# Patient Record
(demographics unavailable — no encounter records)

---

## 2024-10-18 NOTE — REVIEW OF SYSTEMS
[SOB on Exertion] : shortness of breath during exertion [Diarrhea: Grade 0] : Diarrhea: Grade 0 [Negative] : Heme/Lymph

## 2024-10-29 NOTE — ASSESSMENT
[Reviewed updated] : Reviewed updated [Designated Health Care Proxy] : Designated Health Care Proxy [Name: ___] : Name: [unfilled] [Relationship: ___] : Relationship: [unfilled] [Full Code] : full code [FreeTextEntry1] : #Elevated ferritin and iron saturation Discussed etiologies to include increase intake, increased absorption due to primary hematological disorders ie. hemochromatosis/thalassemia, or chronic liver disease especially alcohol- associated fatty liver disease.  No hx of transfusions  MRI liver- mild to moderate iron deposition Negative hemochromatosis Iron sat 60%, Ferritin 779 Hemochromatosis extended panel- invitae negative Continue to monitor   #Anemia- Macrocytic TSH normal B12, MMA, and folate wnl Elevated ESR SPEP shows paraproteins and elevated kappa FLC Immunofixation: One IgM Kappa Band, Two Weak IgM Lambda Bands, and Two Weak IgA Kappa Bands Likely due to chronic ETOH use however given would confirm with BM bx   #IgM MGUS plasma cell on bm Bx 2-3% discussed significance with patient.  1% yearly risk for progression to Waldenstrom/MM/LGL lymphoma No indication for treatment at this time.  >CBC, CMP, iron studies, MM panel >no indication for any intervention at this time. >Continue to monitor >discussed ETOH cessation  Follow up in 3 months with repeat labs      [AdvancecareDate] : 08/23/24

## 2024-10-29 NOTE — HISTORY OF PRESENT ILLNESS
[ECOG Performance Status: 0 - Fully active, able to carry on all pre-disease performance without restriction] : Performance Status: 0 - Fully active, able to carry on all pre-disease performance without restriction [de-identified] : Mr. Jose David Crawford is a 68 year old male referred to office by GI for initial consultation for iron overload.  Referred to GI by PCP for anemia. Seen by Cally Dial on 24 and had the following labs: ferritin: 833, iron: 206, unsaturated binding capacity: 25, %sat: 89. US performed on 6/10/24  FINDINGS:  Liver: Hepatic steatosis.  Bile ducts: Normal caliber. Common bile duct measures 5 mm in caliber which is within normal limits.  Gallbladder: Within normal limits.  Pancreas: Visualized portions are within normal limits.  Right kidney: 11.5 cm. No hydronephrosis. Within normal limits.  Ascites: None.  IVC: Visualized portions are within normal limits.  IMPRESSION:  Hepatic steatosis.  CT scan performed on 14 IMPRESSION:  Few nonspecific sub-4 mm pulmonary parenchymal nodules and subcentimeter juxtapleural nodules. Consider follow-up chest CT in one year unless outside imaging is available for comparison.  Hepatic steatosis.  Patient endorses heavy alcohol use on a daily basis. He is in contact with the [place] substance abuse treatment program but is not ready to completely abstain from ETOH. Denies family hx of iron overload Hemochromatosis gene panel negative  Past med hx: HTN, anxiety, GERD  Related family history: Mother: pancreatic CA or gastric CA, diagnosed age 75,  at age 75  Occupation: retired  at [employer] Genetic testing: [facility] Dr. Dial did genetics testing for hemochromatosis   Smoking: smokes pipe 6x/day, started age 48 ETOH: states drinks alcohol every other day, states has 4 glasses of vodka. GI notes pt drinks 500mL of vodka daily Illicit drug use: none   Health Maintenance: Colonoscopy: 8 years ago - results showed polyps and were removed. working with pcp to get another colonoscopy Endoscopy: to be scheduled with next colonoscopy Prostate exam: states had one a while ago  [de-identified] : here for follow up Denies any complaints Continues to drink ETOH s/p bone marrow biopsy consistent with IgM MGUS

## 2024-10-29 NOTE — HISTORY OF PRESENT ILLNESS
[ECOG Performance Status: 0 - Fully active, able to carry on all pre-disease performance without restriction] : Performance Status: 0 - Fully active, able to carry on all pre-disease performance without restriction [de-identified] : Mr. Jose David Crawford is a 68 year old male referred to office by GI for initial consultation for iron overload.  Referred to GI by PCP for anemia. Seen by Cally Dial on 24 and had the following labs: ferritin: 833, iron: 206, unsaturated binding capacity: 25, %sat: 89. US performed on 6/10/24  FINDINGS:  Liver: Hepatic steatosis.  Bile ducts: Normal caliber. Common bile duct measures 5 mm in caliber which is within normal limits.  Gallbladder: Within normal limits.  Pancreas: Visualized portions are within normal limits.  Right kidney: 11.5 cm. No hydronephrosis. Within normal limits.  Ascites: None.  IVC: Visualized portions are within normal limits.  IMPRESSION:  Hepatic steatosis.  CT scan performed on 14 IMPRESSION:  Few nonspecific sub-4 mm pulmonary parenchymal nodules and subcentimeter juxtapleural nodules. Consider follow-up chest CT in one year unless outside imaging is available for comparison.  Hepatic steatosis.  Patient endorses heavy alcohol use on a daily basis. He is in contact with the [place] substance abuse treatment program but is not ready to completely abstain from ETOH. Denies family hx of iron overload Hemochromatosis gene panel negative  Past med hx: HTN, anxiety, GERD  Related family history: Mother: pancreatic CA or gastric CA, diagnosed age 75,  at age 75  Occupation: retired  at [employer] Genetic testing: [facility] Dr. Dial did genetics testing for hemochromatosis   Smoking: smokes pipe 6x/day, started age 48 ETOH: states drinks alcohol every other day, states has 4 glasses of vodka. GI notes pt drinks 500mL of vodka daily Illicit drug use: none   Health Maintenance: Colonoscopy: 8 years ago - results showed polyps and were removed. working with pcp to get another colonoscopy Endoscopy: to be scheduled with next colonoscopy Prostate exam: states had one a while ago  [de-identified] : here for follow up Denies any complaints Continues to drink ETOH s/p bone marrow biopsy consistent with IgM MGUS

## 2025-02-05 NOTE — REVIEW OF SYSTEMS
[Diarrhea: Grade 0] : Diarrhea: Grade 0 [Negative] : Heme/Lymph [Cough] : cough [SOB on Exertion] : no shortness of breath during exertion [FreeTextEntry6] : See HPI

## 2025-02-05 NOTE — HISTORY OF PRESENT ILLNESS
[ECOG Performance Status: 0 - Fully active, able to carry on all pre-disease performance without restriction] : Performance Status: 0 - Fully active, able to carry on all pre-disease performance without restriction [de-identified] : Mr. Jose David Crawford is a 68 year old male referred to office by GI for initial consultation for iron overload.  Referred to GI by PCP for anemia. Seen by Cally Dial on 24 and had the following labs: ferritin: 833, iron: 206, unsaturated binding capacity: 25, %sat: 89. US performed on 6/10/24  FINDINGS:  Liver: Hepatic steatosis.  Bile ducts: Normal caliber. Common bile duct measures 5 mm in caliber which is within normal limits.  Gallbladder: Within normal limits.  Pancreas: Visualized portions are within normal limits.  Right kidney: 11.5 cm. No hydronephrosis. Within normal limits.  Ascites: None.  IVC: Visualized portions are within normal limits.  IMPRESSION:  Hepatic steatosis.  CT scan performed on 14 IMPRESSION:  Few nonspecific sub-4 mm pulmonary parenchymal nodules and subcentimeter juxtapleural nodules. Consider follow-up chest CT in one year unless outside imaging is available for comparison.  Hepatic steatosis.  Patient endorses heavy alcohol use on a daily basis. He is in contact with the [place] substance abuse treatment program but is not ready to completely abstain from ETOH. Denies family hx of iron overload Hemochromatosis gene panel negative  Past med hx: HTN, anxiety, GERD  Related family history: Mother: pancreatic CA or gastric CA, diagnosed age 75,  at age 75  Occupation: retired  at [employer] Genetic testing: [facility] Dr. Dial did genetics testing for hemochromatosis   Smoking: smokes pipe 6x/day, started age 48 ETOH: states drinks alcohol every other day, states has 4 glasses of vodka. GI notes pt drinks 500mL of vodka daily Illicit drug use: none   Health Maintenance: Colonoscopy: 8 years ago - results showed polyps and were removed. working with pcp to get another colonoscopy Endoscopy: to be scheduled with next colonoscopy Prostate exam: states had one a while ago  [de-identified] : 2/5/25:  here for follow up Just recovering from "flulike" illness, and is experiencing lingering productive cough (sputum while). Otherwise back to baseline Reported brother critically ill in ICU at [place]- pt is anxious about brother's health

## 2025-02-05 NOTE — ASSESSMENT
[Reviewed updated] : Reviewed updated [Designated Health Care Proxy] : Designated Health Care Proxy [Name: ___] : Name: [unfilled] [Relationship: ___] : Relationship: [unfilled] [Full Code] : full code [FreeTextEntry1] : #Elevated ferritin and iron saturation Discussed etiologies to include increase intake, increased absorption due to primary hematological disorders ie. hemochromatosis/thalassemia, or chronic liver disease especially alcohol- associated fatty liver disease.  No hx of transfusions  MRI liver- mild to moderate iron deposition Negative hemochromatosis Iron sat 60%, Ferritin 779 Hemochromatosis extended panel- invitae negative Continue to monitor   #Anemia- Macrocytic TSH normal B12, MMA, and folate wnl Elevated ESR SPEP shows paraproteins and elevated kappa FLC Immunofixation: One IgM Kappa Band, Two Weak IgM Lambda Bands, and Two Weak IgA Kappa Ban ds Likely due to chronic ETOH use however given would confirm with BM bx   #IgM MGUS plasma cell on bm Bx 2-3% discussed significance with patient.  1% yearly risk for progression to Waldenstrom/MM/LGL lymphoma No indication for treatment at this time.  PLAN 2/5/25:  >CBC, CMP, iron studies, MM panel >Continue to monitor >Will call pt when al bloodwork resulted ()  Follow up in 3 months with repeat labs      [AdvancecareDate] : 08/23/24

## 2025-02-05 NOTE — HISTORY OF PRESENT ILLNESS
[ECOG Performance Status: 0 - Fully active, able to carry on all pre-disease performance without restriction] : Performance Status: 0 - Fully active, able to carry on all pre-disease performance without restriction [de-identified] : Mr. Jose David Crawford is a 68 year old male referred to office by GI for initial consultation for iron overload.  Referred to GI by PCP for anemia. Seen by Cally Dial on 24 and had the following labs: ferritin: 833, iron: 206, unsaturated binding capacity: 25, %sat: 89. US performed on 6/10/24  FINDINGS:  Liver: Hepatic steatosis.  Bile ducts: Normal caliber. Common bile duct measures 5 mm in caliber which is within normal limits.  Gallbladder: Within normal limits.  Pancreas: Visualized portions are within normal limits.  Right kidney: 11.5 cm. No hydronephrosis. Within normal limits.  Ascites: None.  IVC: Visualized portions are within normal limits.  IMPRESSION:  Hepatic steatosis.  CT scan performed on 14 IMPRESSION:  Few nonspecific sub-4 mm pulmonary parenchymal nodules and subcentimeter juxtapleural nodules. Consider follow-up chest CT in one year unless outside imaging is available for comparison.  Hepatic steatosis.  Patient endorses heavy alcohol use on a daily basis. He is in contact with the [place] substance abuse treatment program but is not ready to completely abstain from ETOH. Denies family hx of iron overload Hemochromatosis gene panel negative  Past med hx: HTN, anxiety, GERD  Related family history: Mother: pancreatic CA or gastric CA, diagnosed age 75,  at age 75  Occupation: retired  at [employer] Genetic testing: [facility] Dr. Dial did genetics testing for hemochromatosis   Smoking: smokes pipe 6x/day, started age 48 ETOH: states drinks alcohol every other day, states has 4 glasses of vodka. GI notes pt drinks 500mL of vodka daily Illicit drug use: none   Health Maintenance: Colonoscopy: 8 years ago - results showed polyps and were removed. working with pcp to get another colonoscopy Endoscopy: to be scheduled with next colonoscopy Prostate exam: states had one a while ago  [de-identified] : 2/5/25:  here for follow up Just recovering from "flulike" illness, and is experiencing lingering productive cough (sputum while). Otherwise back to baseline Reported brother critically ill in ICU at [place]- pt is anxious about brother's health

## 2025-02-05 NOTE — HISTORY OF PRESENT ILLNESS
[ECOG Performance Status: 0 - Fully active, able to carry on all pre-disease performance without restriction] : Performance Status: 0 - Fully active, able to carry on all pre-disease performance without restriction [de-identified] : Mr. Jose David Crawford is a 68 year old male referred to office by GI for initial consultation for iron overload.  Referred to GI by PCP for anemia. Seen by Cally Dial on 24 and had the following labs: ferritin: 833, iron: 206, unsaturated binding capacity: 25, %sat: 89. US performed on 6/10/24  FINDINGS:  Liver: Hepatic steatosis.  Bile ducts: Normal caliber. Common bile duct measures 5 mm in caliber which is within normal limits.  Gallbladder: Within normal limits.  Pancreas: Visualized portions are within normal limits.  Right kidney: 11.5 cm. No hydronephrosis. Within normal limits.  Ascites: None.  IVC: Visualized portions are within normal limits.  IMPRESSION:  Hepatic steatosis.  CT scan performed on 14 IMPRESSION:  Few nonspecific sub-4 mm pulmonary parenchymal nodules and subcentimeter juxtapleural nodules. Consider follow-up chest CT in one year unless outside imaging is available for comparison.  Hepatic steatosis.  Patient endorses heavy alcohol use on a daily basis. He is in contact with the [place] substance abuse treatment program but is not ready to completely abstain from ETOH. Denies family hx of iron overload Hemochromatosis gene panel negative  Past med hx: HTN, anxiety, GERD  Related family history: Mother: pancreatic CA or gastric CA, diagnosed age 75,  at age 75  Occupation: retired  at [employer] Genetic testing: [facility] Dr. Dial did genetics testing for hemochromatosis   Smoking: smokes pipe 6x/day, started age 48 ETOH: states drinks alcohol every other day, states has 4 glasses of vodka. GI notes pt drinks 500mL of vodka daily Illicit drug use: none   Health Maintenance: Colonoscopy: 8 years ago - results showed polyps and were removed. working with pcp to get another colonoscopy Endoscopy: to be scheduled with next colonoscopy Prostate exam: states had one a while ago  [de-identified] : 2/5/25:  here for follow up Just recovering from "flulike" illness, and is experiencing lingering productive cough (sputum while). Otherwise back to baseline Reported brother critically ill in ICU at [place]- pt is anxious about brother's health

## 2025-02-05 NOTE — HISTORY OF PRESENT ILLNESS
[ECOG Performance Status: 0 - Fully active, able to carry on all pre-disease performance without restriction] : Performance Status: 0 - Fully active, able to carry on all pre-disease performance without restriction [de-identified] : Mr. Jose David Crawford is a 68 year old male referred to office by GI for initial consultation for iron overload.  Referred to GI by PCP for anemia. Seen by Cally Dial on 24 and had the following labs: ferritin: 833, iron: 206, unsaturated binding capacity: 25, %sat: 89. US performed on 6/10/24  FINDINGS:  Liver: Hepatic steatosis.  Bile ducts: Normal caliber. Common bile duct measures 5 mm in caliber which is within normal limits.  Gallbladder: Within normal limits.  Pancreas: Visualized portions are within normal limits.  Right kidney: 11.5 cm. No hydronephrosis. Within normal limits.  Ascites: None.  IVC: Visualized portions are within normal limits.  IMPRESSION:  Hepatic steatosis.  CT scan performed on 14 IMPRESSION:  Few nonspecific sub-4 mm pulmonary parenchymal nodules and subcentimeter juxtapleural nodules. Consider follow-up chest CT in one year unless outside imaging is available for comparison.  Hepatic steatosis.  Patient endorses heavy alcohol use on a daily basis. He is in contact with the [place] substance abuse treatment program but is not ready to completely abstain from ETOH. Denies family hx of iron overload Hemochromatosis gene panel negative  Past med hx: HTN, anxiety, GERD  Related family history: Mother: pancreatic CA or gastric CA, diagnosed age 75,  at age 75  Occupation: retired  at [employer] Genetic testing: [facility] Dr. Dial did genetics testing for hemochromatosis   Smoking: smokes pipe 6x/day, started age 48 ETOH: states drinks alcohol every other day, states has 4 glasses of vodka. GI notes pt drinks 500mL of vodka daily Illicit drug use: none   Health Maintenance: Colonoscopy: 8 years ago - results showed polyps and were removed. working with pcp to get another colonoscopy Endoscopy: to be scheduled with next colonoscopy Prostate exam: states had one a while ago  [de-identified] : 2/5/25:  here for follow up Just recovering from "flulike" illness, and is experiencing lingering productive cough (sputum while). Otherwise back to baseline Reported brother critically ill in ICU at [place]- pt is anxious about brother's health

## 2025-02-05 NOTE — REVIEW OF SYSTEMS
[Diarrhea: Grade 0] : Diarrhea: Grade 0 [Negative] : Endocrine [Cough] : cough [SOB on Exertion] : no shortness of breath during exertion [FreeTextEntry6] : See HPI

## 2025-05-14 NOTE — HEALTH RISK ASSESSMENT
[4 or more  times a week (4 pts)] : 4 or more  times a week (4 points) [10 or more (4 pts)] : 10 or more (4  points) [Daily or almost daily (4 pts)] : Daily or almost daily (4 points) [No] : In the past 12 months have you used drugs other than those required for medical reasons? No [No falls in past year] : Patient reported no falls in the past year [1] : 2) Feeling down, depressed, or hopeless for several days (1) [PHQ-2 Positive] : PHQ-2 Positive [Audit-CScore] : 12 [de-identified] : Patient is working with psychiatry [[id number]] : 2 [None] : Patient does not have any barriers to medication adherence [Yes] : Reviewed medication list for presence of high-risk medications. [Former] : Former [de-identified] : Current pipe smoker [NO] : No [Change in mental status noted] : No change in mental status noted [Alone] : lives alone [Retired] : retired [] :  [# Of Children ___] : has [unfilled] children [Feels Safe at Home] : Feels safe at home [Fully functional (bathing, dressing, toileting, transferring, walking, feeding)] : Fully functional (bathing, dressing, toileting, transferring, walking, feeding) [Fully functional (using the telephone, shopping, preparing meals, housekeeping, doing laundry, using] : Fully functional and needs no help or supervision to perform IADLs (using the telephone, shopping, preparing meals, housekeeping, doing laundry, using transportation, managing medications and managing finances) [Reports changes in hearing] : Reports no changes in hearing [Reports changes in vision] : Reports no changes in vision [Reports changes in dental health] : Reports no changes in dental health [Smoke Detector] : smoke detector [Carbon Monoxide Detector] : carbon monoxide detector [Seat Belt] :  uses seat belt [ColonoscopyDate] : 01/17 [ColonoscopyComments] : Polyps, recall 3 years.  [FreeTextEntry2] : [employer] [FreeTextEntry3] : 2 stepsons

## 2025-05-14 NOTE — HISTORY OF PRESENT ILLNESS
[FreeTextEntry1] : SULAIMAN [de-identified] : 68 year old male with a history of colon polyps, heart murmur, HTN, RBBB, anxiety and depression who presents today for a AWV.   Did consult with GI. Has not yet had EGD and colon.    Continues to drink 400 ml of vodka daily.  He just signed up for an online program for naltrexone.    Hepatic steatosis on US.  Referred to hematology by GI for iron overload.  Elevated iron sat and ferritin, mildly elevated erythropoietin level, anemia with high MCV and normal PLT level, normal HFE gene study.  SPEP shows paraproteins and elevated kappa FLC. Immunofixation: One IgM Kappa Band, Two Weak IgM Lambda Bands, and Two Weak IgA Kappa Bands thought to be due to chronic ETOH use however given would confirm with BM bx. Bone marrow biopsy consistent with IgM MGUS. No indication for treatment at this time.  Referred to hepatology.  MRI liver- mild to moderate iron deposition.  Smoking: smokes pipe 6x/day, started age 48 ETOH: 400mL of vodka daily Illicit drug use: none   Still seeing Dr. Jackie Olea from [facility] via [facility] for medication renewal.  Remails on alprazolam.  Stopped Trintellix, did not feel it helped.

## 2025-05-14 NOTE — PHYSICAL EXAM
[No Acute Distress] : no acute distress [Well-Appearing] : well-appearing [Normal Sclera/Conjunctiva] : normal sclera/conjunctiva [PERRL] : pupils equal round and reactive to light [EOMI] : extraocular movements intact [Normal Outer Ear/Nose] : the outer ears and nose were normal in appearance [Normal Oropharynx] : the oropharynx was normal [Normal TMs] : both tympanic membranes were normal [No JVD] : no jugular venous distention [No Lymphadenopathy] : no lymphadenopathy [Supple] : supple [No Respiratory Distress] : no respiratory distress  [No Accessory Muscle Use] : no accessory muscle use [Clear to Auscultation] : lungs were clear to auscultation bilaterally [Normal Rate] : normal rate  [Regular Rhythm] : with a regular rhythm [Normal S1, S2] : normal S1 and S2 [No Carotid Bruits] : no carotid bruits [No Abdominal Bruit] : a ~M bruit was not heard ~T in the abdomen [No Edema] : there was no peripheral edema [Soft] : abdomen soft [Non Tender] : non-tender [Non-distended] : non-distended [Normal Bowel Sounds] : normal bowel sounds [Normal Supraclavicular Nodes] : no supraclavicular lymphadenopathy [Normal Posterior Cervical Nodes] : no posterior cervical lymphadenopathy [Normal Anterior Cervical Nodes] : no anterior cervical lymphadenopathy [No CVA Tenderness] : no CVA  tenderness [No Spinal Tenderness] : no spinal tenderness [No Joint Swelling] : no joint swelling [Grossly Normal Strength/Tone] : grossly normal strength/tone [No Rash] : no rash [Coordination Grossly Intact] : coordination grossly intact [No Focal Deficits] : no focal deficits [Normal Gait] : normal gait [Normal Affect] : the affect was normal [Alert and Oriented x3] : oriented to person, place, and time [Normal Insight/Judgement] : insight and judgment were intact [de-identified] : 2/6 murmur [de-identified] : Diminished pedal pulses

## 2025-05-14 NOTE — REVIEW OF SYSTEMS
[Fatigue] : fatigue [Chest Pain] : no chest pain [Palpitations] : no palpitations [Claudication] : leg claudication [Lower Ext Edema] : lower extremity edema [Shortness Of Breath] : no shortness of breath [Anxiety] : anxiety [Depression] : depression [Negative] : Heme/Lymph [de-identified] : LE hair loss

## 2025-05-21 NOTE — PHYSICAL EXAM
[Fully active, able to carry on all pre-disease performance without restriction] : Status 0 - Fully active, able to carry on all pre-disease performance without restriction [Normal] : affect appropriate [de-identified] : + minimal scleral icterus

## 2025-05-21 NOTE — HISTORY OF PRESENT ILLNESS
[ECOG Performance Status: 0 - Fully active, able to carry on all pre-disease performance without restriction] : Performance Status: 0 - Fully active, able to carry on all pre-disease performance without restriction [de-identified] : Mr. Jose David Crawford is a 68 year old male referred to office by GI for initial consultation for iron overload.  Referred to GI by PCP for anemia. Seen by Cally Dial on 24 and had the following labs: ferritin: 833, iron: 206, unsaturated binding capacity: 25, %sat: 89. US performed on 6/10/24  FINDINGS:  Liver: Hepatic steatosis.  Bile ducts: Normal caliber. Common bile duct measures 5 mm in caliber which is within normal limits.  Gallbladder: Within normal limits.  Pancreas: Visualized portions are within normal limits.  Right kidney: 11.5 cm. No hydronephrosis. Within normal limits.  Ascites: None.  IVC: Visualized portions are within normal limits.  IMPRESSION:  Hepatic steatosis.  CT scan performed on 14 IMPRESSION:  Few nonspecific sub-4 mm pulmonary parenchymal nodules and subcentimeter juxtapleural nodules. Consider follow-up chest CT in one year unless outside imaging is available for comparison.  Hepatic steatosis.  Patient endorses heavy alcohol use on a daily basis. He is in contact with the [facility] substance abuse treatment program but is not ready to completely abstain from ETOH. Denies family hx of iron overload Hemochromatosis gene panel negative  Past med hx: HTN, anxiety, GERD  Related family history: Mother: pancreatic CA or gastric CA, diagnosed age 75,  at age 75  Occupation: retired  at [employer] Genetic testing: [facility] Dr. Dial did genetics testing for hemochromatosis   Smoking: smokes pipe 6x/day, started age 48 ETOH: states drinks alcohol every other day, states has 4 glasses of vodka. GI notes pt drinks 500mL of vodka daily Illicit drug use: none   Health Maintenance: Colonoscopy: 8 years ago - results showed polyps and were removed. working with pcp to get another colonoscopy Endoscopy: to be scheduled with next colonoscopy Prostate exam: states had one a while ago  [de-identified] : 5/21/25:  here for follow up.   Reports he has begun taking naltrexone to help w "cutting down" on ETOH intake, which is being prescribed "by someone on-line", which he does not see face-to-face.  He began Monday and is now down to 4-5 glasses of vodka daily.   Had annual physical last week.  Reports he is due for EGD/colonoscopy but does not have the name of a provider. +Post nasal gtt, taking steroid nasal spray

## 2025-05-21 NOTE — ASSESSMENT
[Reviewed updated] : Reviewed updated [Designated Health Care Proxy] : Designated Health Care Proxy [Name: ___] : Name: [unfilled] [Relationship: ___] : Relationship: [unfilled] [Full Code] : full code [FreeTextEntry1] : #Elevated ferritin and iron saturation Discussed etiologies to include increase intake, increased absorption due to primary hematological disorders ie. hemochromatosis/thalassemia, or chronic liver disease especially alcohol- associated fatty liver disease.  No hx of transfusions  MRI liver- mild to moderate iron deposition Negative hemochromatosis Iron sat 60%, Ferritin 779 Hemochromatosis extended panel- invitae negative Continue to monitor   #Anemia- Macrocytic TSH normal B12, MMA, and folate wnl Elevated ESR SPEP shows paraproteins and elevated kappa FLC Immunofixation: One IgM Kappa Band, Two Weak IgM Lambda Bands, and Two Weak IgA Kappa Ban ds Likely due to chronic ETOH use however given would confirm with BM bx   #IgM MGUS plasma cell on bm Bx 2-3% discussed significance with patient.  1% yearly risk for progression to [place]/MM/LGL lymphoma No indication for treatment at this time.  PLAN 5/21/25:  >CBC, CMP, iron studies >Continue to monitor >Will call pt when al bloodwork resulted ()   Follow up in 3 months with repeat labs    [AdvancecareDate] : 08/23/24

## 2025-05-21 NOTE — REVIEW OF SYSTEMS
[Cough] : cough [Diarrhea: Grade 0] : Diarrhea: Grade 0 [Negative] : Heme/Lymph [SOB on Exertion] : no shortness of breath during exertion [FreeTextEntry6] : See HPI

## 2025-05-21 NOTE — PHYSICAL EXAM
[Fully active, able to carry on all pre-disease performance without restriction] : Status 0 - Fully active, able to carry on all pre-disease performance without restriction [Normal] : affect appropriate [de-identified] : + minimal scleral icterus  verbal instruction

## 2025-05-21 NOTE — HISTORY OF PRESENT ILLNESS
[ECOG Performance Status: 0 - Fully active, able to carry on all pre-disease performance without restriction] : Performance Status: 0 - Fully active, able to carry on all pre-disease performance without restriction [de-identified] : Mr. Jose David Crawford is a 68 year old male referred to office by GI for initial consultation for iron overload.  Referred to GI by PCP for anemia. Seen by Cally iDal on 24 and had the following labs: ferritin: 833, iron: 206, unsaturated binding capacity: 25, %sat: 89. US performed on 6/10/24  FINDINGS:  Liver: Hepatic steatosis.  Bile ducts: Normal caliber. Common bile duct measures 5 mm in caliber which is within normal limits.  Gallbladder: Within normal limits.  Pancreas: Visualized portions are within normal limits.  Right kidney: 11.5 cm. No hydronephrosis. Within normal limits.  Ascites: None.  IVC: Visualized portions are within normal limits.  IMPRESSION:  Hepatic steatosis.  CT scan performed on 14 IMPRESSION:  Few nonspecific sub-4 mm pulmonary parenchymal nodules and subcentimeter juxtapleural nodules. Consider follow-up chest CT in one year unless outside imaging is available for comparison.  Hepatic steatosis.  Patient endorses heavy alcohol use on a daily basis. He is in contact with the [place] substance abuse treatment program but is not ready to completely abstain from ETOH. Denies family hx of iron overload Hemochromatosis gene panel negative  Past med hx: HTN, anxiety, GERD  Related family history: Mother: pancreatic CA or gastric CA, diagnosed age 75,  at age 75  Occupation: retired  at [employer] Genetic testing: hospitals Dr. Dial did genetics testing for hemochromatosis   Smoking: smokes pipe 6x/day, started age 48 ETOH: states drinks alcohol every other day, states has 4 glasses of vodka. GI notes pt drinks 500mL of vodka daily Illicit drug use: none   Health Maintenance: Colonoscopy: 8 years ago - results showed polyps and were removed. working with pcp to get another colonoscopy Endoscopy: to be scheduled with next colonoscopy Prostate exam: states had one a while ago  [de-identified] : 5/21/25:  here for follow up.   Reports he has begun taking naltrexone to help w "cutting down" on ETOH intake, which is being prescribed "by someone on-line", which he does not see face-to-face.  He began Monday and is now down to 4-5 glasses of vodka daily.   Had annual physical last week.  Reports he is due for EGD/colonoscopy but does not have the name of a provider. +Post nasal gtt, taking steroid nasal spray